# Patient Record
Sex: FEMALE | Race: WHITE | NOT HISPANIC OR LATINO | Employment: STUDENT | URBAN - METROPOLITAN AREA
[De-identification: names, ages, dates, MRNs, and addresses within clinical notes are randomized per-mention and may not be internally consistent; named-entity substitution may affect disease eponyms.]

---
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
## 2025-03-09 DIAGNOSIS — L21.0 SEBORRHEA CAPITIS: ICD-10-CM

## 2025-03-11 RX ORDER — KETOCONAZOLE 20 MG/ML
SHAMPOO, SUSPENSION TOPICAL EVERY OTHER DAY
Qty: 120 ML | Refills: 1 | Status: SHIPPED | OUTPATIENT
Start: 2025-03-11

## 2025-06-06 ENCOUNTER — APPOINTMENT (OUTPATIENT)
Dept: PEDIATRICS | Facility: CLINIC | Age: 20
End: 2025-06-06
Payer: COMMERCIAL

## 2025-06-06 VITALS
HEIGHT: 62 IN | SYSTOLIC BLOOD PRESSURE: 99 MMHG | BODY MASS INDEX: 20.52 KG/M2 | WEIGHT: 111.5 LBS | TEMPERATURE: 98 F | HEART RATE: 69 BPM | DIASTOLIC BLOOD PRESSURE: 63 MMHG

## 2025-06-06 DIAGNOSIS — L70.0 ACNE VULGARIS: ICD-10-CM

## 2025-06-06 DIAGNOSIS — Z13.220 SCREENING FOR LIPID DISORDERS: ICD-10-CM

## 2025-06-06 DIAGNOSIS — Z13.31 SCREENING FOR DEPRESSION: ICD-10-CM

## 2025-06-06 DIAGNOSIS — Z00.00 WELLNESS EXAMINATION: Primary | ICD-10-CM

## 2025-06-06 PROCEDURE — 1036F TOBACCO NON-USER: CPT | Performed by: PEDIATRICS

## 2025-06-06 PROCEDURE — 96127 BRIEF EMOTIONAL/BEHAV ASSMT: CPT | Performed by: PEDIATRICS

## 2025-06-06 PROCEDURE — 99213 OFFICE O/P EST LOW 20 MIN: CPT | Performed by: PEDIATRICS

## 2025-06-06 PROCEDURE — 99395 PREV VISIT EST AGE 18-39: CPT | Performed by: PEDIATRICS

## 2025-06-06 PROCEDURE — 3008F BODY MASS INDEX DOCD: CPT | Performed by: PEDIATRICS

## 2025-06-06 RX ORDER — ADAPALENE AND BENZOYL PEROXIDE GEL, 0.1%/2.5% 1; 25 MG/G; MG/G
GEL TOPICAL
Qty: 45 G | Refills: 1 | Status: SHIPPED | OUTPATIENT
Start: 2025-06-06

## 2025-06-06 RX ORDER — ADAPALENE AND BENZOYL PEROXIDE GEL, 0.1%/2.5% 1; 25 MG/G; MG/G
GEL TOPICAL
Qty: 45 G | Refills: 1 | Status: SHIPPED | OUTPATIENT
Start: 2025-06-06 | End: 2025-06-06 | Stop reason: ALTCHOICE

## 2025-06-06 RX ORDER — ADAPALENE AND BENZOYL PEROXIDE GEL, 0.1%/2.5% 1; 25 MG/G; MG/G
GEL TOPICAL
Qty: 45 G | Refills: 1 | Status: SHIPPED | OUTPATIENT
Start: 2025-06-06 | End: 2025-06-06 | Stop reason: SDUPTHER

## 2025-06-06 ASSESSMENT — PATIENT HEALTH QUESTIONNAIRE - PHQ9
1. LITTLE INTEREST OR PLEASURE IN DOING THINGS: NOT AT ALL
9. THOUGHTS THAT YOU WOULD BE BETTER OFF DEAD, OR OF HURTING YOURSELF: NOT AT ALL
1. LITTLE INTEREST OR PLEASURE IN DOING THINGS: NOT AT ALL
10. IF YOU CHECKED OFF ANY PROBLEMS, HOW DIFFICULT HAVE THESE PROBLEMS MADE IT FOR YOU TO DO YOUR WORK, TAKE CARE OF THINGS AT HOME, OR GET ALONG WITH OTHER PEOPLE: NOT DIFFICULT AT ALL
10. IF YOU CHECKED OFF ANY PROBLEMS, HOW DIFFICULT HAVE THESE PROBLEMS MADE IT FOR YOU TO DO YOUR WORK, TAKE CARE OF THINGS AT HOME, OR GET ALONG WITH OTHER PEOPLE: NOT DIFFICULT AT ALL
3. TROUBLE FALLING OR STAYING ASLEEP OR SLEEPING TOO MUCH: SEVERAL DAYS
2. FEELING DOWN, DEPRESSED OR HOPELESS: NOT AT ALL
3. TROUBLE FALLING OR STAYING ASLEEP: SEVERAL DAYS
6. FEELING BAD ABOUT YOURSELF - OR THAT YOU ARE A FAILURE OR HAVE LET YOURSELF OR YOUR FAMILY DOWN: NOT AT ALL
4. FEELING TIRED OR HAVING LITTLE ENERGY: SEVERAL DAYS
9. THOUGHTS THAT YOU WOULD BE BETTER OFF DEAD, OR OF HURTING YOURSELF: NOT AT ALL
8. MOVING OR SPEAKING SO SLOWLY THAT OTHER PEOPLE COULD HAVE NOTICED. OR THE OPPOSITE, BEING SO FIGETY OR RESTLESS THAT YOU HAVE BEEN MOVING AROUND A LOT MORE THAN USUAL: NOT AT ALL
7. TROUBLE CONCENTRATING ON THINGS, SUCH AS READING THE NEWSPAPER OR WATCHING TELEVISION: NOT AT ALL
5. POOR APPETITE OR OVEREATING: NOT AT ALL
5. POOR APPETITE OR OVEREATING: NOT AT ALL
SUM OF ALL RESPONSES TO PHQ QUESTIONS 1-9: 2
7. TROUBLE CONCENTRATING ON THINGS, SUCH AS READING THE NEWSPAPER OR WATCHING TELEVISION: NOT AT ALL
SUM OF ALL RESPONSES TO PHQ9 QUESTIONS 1 & 2: 0
2. FEELING DOWN, DEPRESSED OR HOPELESS: NOT AT ALL
6. FEELING BAD ABOUT YOURSELF - OR THAT YOU ARE A FAILURE OR HAVE LET YOURSELF OR YOUR FAMILY DOWN: NOT AT ALL
4. FEELING TIRED OR HAVING LITTLE ENERGY: SEVERAL DAYS
8. MOVING OR SPEAKING SO SLOWLY THAT OTHER PEOPLE COULD HAVE NOTICED. OR THE OPPOSITE - BEING SO FIDGETY OR RESTLESS THAT YOU HAVE BEEN MOVING AROUND A LOT MORE THAN USUAL: NOT AT ALL

## 2025-06-06 NOTE — PROGRESS NOTES
Subjective   Xiao is a 19 y.o. female who presents today for her Health Maintenance and Supervision Exam.    General Health:  Xiao is overall in good health.  Concerns today: none     Social and Family History:  At home, there have been no interval changes.  Parental support, work/family balance? Yes    Nutrition:  Balanced diet? Yes  Current Diet: A variety of meats, vegetables, fruits with a calcium source.  Concerns about body image? No  Uses nutritional supplements? No    Dental Care:  Xiao has a dental home? Yes  Dental hygiene regularly performed? Yes  Fluoridate water: Yes    Elimination:  Elimination patterns appropriate: Yes  Sleep:  Sleep patterns : tries to get 8 hrs  Sleep problems: none    Behavior/Socialization:  Lives with : home for the summer from college--lives with parents, 4 sisters, during school year lives in college dorms. Gets along with roommates  Good relationships with parents and siblings? Yes  Supportive adult relationship? Yes  Permitted to make decisions? Yes  Responsibilities and chores? Yes  Family Meals? Yes  Normal peer relationships? Yes       Development/Education:  Age Appropriate: Yes    Xiao finished first year at [employer]. Studying engineering  Any educational accommodations? No  Academically well adjusted? Yes  Performing at grade level? Yes  Socially well adjusted? Yes    Activities:  Physical Activity: Yes  Limited screen/media use: Yes  Extracurricular Activities/Hobbies/Interests: works part time, gymnastics.     Sports Participation Screening:  Pre-sports participation survey questions assessed and passed? Yes    Menstrual Status:  Regular cycle intervals: Yes    Sexual History:  Dating? no  Sexually Active? no    Drugs:  Tobacco? No  Uses drugs? No  Vaping? no    Risk Assessment:  Additional health risks: No    Safety Assessment:  Safety topics reviewed: Yes  Uses safety belts? Yes   Mouthguard for sports ? Yes   Working Smoke detectors/carbon  monoxide detectors Yes   Firearms in the home? No   Secondhand smoke? No   Nonviolent home? Yes   Sunscreen use? Yes   Helmets/Sports safety gear? Yes     Mental Health:  Depression screening result Negative   Self confidence?? Yes   Coping Skills Yes   Thoughts of self harm/suicide? No   Over the past 2 weeks, how often have you been bothered by any of the following problems?  Little interest or pleasure in doing things: (Patient-Rptd) Not at all  Feeling down, depressed, or hopeless: (Patient-Rptd) Not at all  Patient Health Questionnaire-2 Score: (Patient-Rptd) 0  Over the past 2 weeks, how often have you been bothered by any of the following problems?  Trouble falling or staying asleep, or sleeping too much: (Patient-Rptd) Several days  Feeling tired or having little energy: (Patient-Rptd) Several days  Poor appetite or overeating: (Patient-Rptd) Not at all  Feeling bad about yourself - or that you are a failure or have let yourself or your family down: (Patient-Rptd) Not at all  Trouble concentrating on things, such as reading the newspaper or watching television: (Patient-Rptd) Not at all  Moving or speaking so slowly that other people could have noticed? Or the opposite - being so fidgety or restless that you have been moving around a lot more than usual.: (Patient-Rptd) Not at all  Thoughts that you would be better off dead or hurting yourself in some way: (Patient-Rptd) Not at all  Patient Health Questionnaire-9 Score: (Patient-Rptd) 2  Ask Suicide-Screening Questions  1. In the past few weeks, have you wished you were dead?: (Patient-Rptd) No  2. In the past few weeks, have you felt that you or your family would be better off if you were dead?: (Patient-Rptd) No  3. In the past week, have you been having thoughts about killing yourself?: (Patient-Rptd) No  4. Have you ever tried to kill yourself?: (Patient-Rptd) No  Calculated Risk Score: (Patient-Rptd) No intervention is necessary    Objective   There  were no vitals taken for this visit.  Growth parameters are noted and are appropriate for age.  General:   alert and oriented, in no acute distress   Gait:   normal   Skin:   Scattered erythematous papules, pustules on forehead, nose, chin   Oral cavity:   lips, mucosa, and tongue normal; teeth and gums normal   Eyes:   sclerae white, pupils equal and reactive   Ears:   normal bilaterally   Neck:   no adenopathy and thyroid not enlarged, symmetric, no tenderness/mass/nodules   Lungs:  clear to auscultation bilaterally   Heart:   regular rate and rhythm, S1, S2 normal, no murmur, click, rub or gallop   Abdomen:  soft, non-tender; bowel sounds normal; no masses, no organomegaly   :  normal external genitalia, no erythema, no discharge   Reyes Stage:   5   Extremities:  extremities normal, warm and well-perfused; no cyanosis, clubbing, or edema, negative forward bend   Neuro:  normal without focal findings and muscle tone and strength normal and symmetric     Assessment/Plan     PLAN:  School performance, peer & dating relationships, growth measurements and BMI%, nutrition, and age appropriate exercise were reviewed at today's Health Maintenance Visit  2.   Advised to limit high sugar containing beverages (soda, juice, sports drinks) and excess caffeine  3.   Avoid skipped meals and excess portions. Recommend a daily multivitamin  4.   Acne on exam today. Discussed skin care at home. Recommend a trial of epiduo. Reviewed how to use, possible side effects.   5.   Hearing, Vision and Lipid screening completed if appropriate for this patient  6.   PHQ-9 completed. Risk factors: No  8.   Vaccines reviewed and or updated if applicable  7.   Follow up in 1 year for next well child exam or sooner with concerns.         lip